# Patient Record
(demographics unavailable — no encounter records)

---

## 2024-12-16 NOTE — ASSESSMENT
[FreeTextEntry1] : Tobacco use: counselling provided for cessation she is agreeable to NRT with gum-    Post nasal drip: trial of fluticasone nasal spray  nonobstructing incidental kidney stones found on CT: check UA hydration discussed  depression: stable c/w current medications and follow up with psych check labwork  low back pain:  mild intermittent DJD of lumbar spine on prior xrays adivsed PRN tylenol   screenings: mammogram cologaurd lung cancer CT chest

## 2024-12-16 NOTE — REVIEW OF SYSTEMS
[As Noted in HPI] : as noted in HPI [Depression] : depression [Negative] : Heme/Lymph [FreeTextEntry9] : low back pain occasionally

## 2024-12-16 NOTE — PHYSICAL EXAM
[Alert] : alert [No Acute Distress] : in no acute distress [Sclera] : the sclera and conjunctiva were normal [EOMI] : extraocular movements were intact [Normal Outer Ear/Nose] : the ears and nose were normal in appearance [Normal Appearance] : the appearance of the neck was normal [Normal] : heart rate was normal and rhythm regular, normal S1 and S2 [Edema] : edema was not present [Normal Gait] : normal gait [No Clubbing, Cyanosis] : no clubbing or cyanosis of the fingernails [Involuntary Movements] : no involuntary movements were seen [Normal Color / Pigmentation] : normal skin color and pigmentation [No Focal Deficits] : no focal deficits [Normal Affect] : the affect was normal [Normal Mood] : the mood was normal [de-identified] : mucus within posterior pharynx

## 2024-12-16 NOTE — HISTORY OF PRESENT ILLNESS
[FreeTextEntry1] : 67yoF with pmhx of depression, seen for follow up viist.  complains of mucus in mouth that she is needing to spit out also with nasal discharge-  (posteriorly)  depression: follows with psych no changes in her medications. adherent with medications  lives with boyfriend  smokes 5 cigarettes daily, she is open to quitting   1 son

## 2025-01-08 NOTE — HISTORY OF PRESENT ILLNESS
[FreeTextEntry1] : Hair loss for 2 weeks. Blisters in scalp after every hair dye. For years. Resolve after 2 weeks.  [de-identified] : No personal or family history of cutaneous malignancy. Self dyes.

## 2025-01-08 NOTE — ASSESSMENT
[FreeTextEntry1] : Alert, oriented, well, pleasant.  Scalp with mild scaling. No erythema. 1 linear dry crust mid vertex. No bald patches. Hair pull negative. Density even throughout.  Seborrheic dermatitis with excoriation. Contact dermatitis by history.  start TSal shampoo leave on 2 minutes before rinsing off. Washes hair twice per week. Change hair dye to hypoallergenic. Follow directions to test prior to applying to scalp. f/u if recurs when active.

## 2025-03-24 NOTE — PHYSICAL EXAM
[Alert] : alert [No Acute Distress] : in no acute distress [Sclera] : the sclera and conjunctiva were normal [EOMI] : extraocular movements were intact [Normal Outer Ear/Nose] : the ears and nose were normal in appearance [Normal Appearance] : the appearance of the neck was normal [Normal] : heart rate was normal and rhythm regular, normal S1 and S2 [Edema] : edema was not present [Involuntary Movements] : no involuntary movements were seen [Normal Color / Pigmentation] : normal skin color and pigmentation [No Focal Deficits] : no focal deficits [Normal Affect] : the affect was normal [Normal Mood] : the mood was normal

## 2025-03-24 NOTE — HISTORY OF PRESENT ILLNESS
[No falls in past year] : Patient reported no falls in the past year [Completely Independent] : Completely independent. [Several Days (1)] : 2.) Feeling down, depressed or hopeless? Several days [Not at All (0)] : 9.) Thoughts that you would be off dead or of hurting yourself in some way? Not at all [Mild] : Severity of Depression is Mild [Somewhat Difficult] : How difficult have these problems made it for you to do your work, take care of things at home, or get along with people? Somewhat difficult [FreeTextEntry1] : 67yoF with pmhx of depression seen for follow up visit.  labwork reviewed with patient as below  depression: follows with psych no changes in her medications. adherent with medications PHQ-9 today 2 she expresses wanting to change medications  lives with boyfriend  last visit discussed smoking cessation: was no successful with using gum still smoking 3-5 cigarrettes daily wants to try to stop  didn't yet do cologaurd    [[id number]] : 2

## 2025-03-24 NOTE — ASSESSMENT
[FreeTextEntry1] : Tobacco use: counselling provided for cessation she is agreeable to NRT with lozenges-  nonobstructing incidental kidney stones found on CT: hydration discussed  depression: stable- c/w current medications and follow up with psych  HLD: tchol 226 ldl 134 borderline risk diet and exercise, smoking cessation monitor

## 2025-04-10 NOTE — HISTORY OF PRESENT ILLNESS
[FreeTextEntry1] : 67yoF with pmhx of depression seen for acute visit.   left hand numbness associated with mild pain.  stiffness with weakness.  she is right handed. denies trauma symptoms both day and night denies arm or neck pain denies sympoms in her right  nightime with leg cramps,more in her left reports she doesn't drink much water does not eat any fruit or vegtables   depression: adherent with medications, denies recent changes. denies side effects

## 2025-04-10 NOTE — ASSESSMENT
[FreeTextEntry1] : CTS of left suspected: discussed brace at night start OT if worsens will refer to ortho  muscle cramps: poor diet, discussed starting MTV increased hydration  depression: stable- c/w current medications and follow up with psych

## 2025-04-10 NOTE — PHYSICAL EXAM
[Alert] : alert [No Acute Distress] : in no acute distress [Sclera] : the sclera and conjunctiva were normal [EOMI] : extraocular movements were intact [Normal Appearance] : the appearance of the neck was normal [No Respiratory Distress] : no respiratory distress [No Acc Muscle Use] : no accessory muscle use [Respiration, Rhythm And Depth] : normal respiratory rhythm and effort [Normal Affect] : the affect was normal [Normal Mood] : the mood was normal [de-identified] : mild thenar atrophy of left

## 2025-06-12 NOTE — HISTORY OF PRESENT ILLNESS
[FreeTextEntry1] : Concerned about the appearance of fine lines of abdomen, forearms for yrs. No treatment. [de-identified] :  No family or personal history of skin cancer.   ABBI MURRY

## 2025-06-12 NOTE — ASSESSMENT
[FreeTextEntry1] : Alert, oriented, well pleasant.   Sun-exposed cutaneous exam. No evidence of cutaneous malignancy.   Brown, yellow papules and plaques generalized. Seborrheic keratoses. No treatment.   Actinic damage. Reviewed sun protection.  violaceous patch right forearm. Purpura. No treatment.   thin skin especially hands Fragile skin.  mild scaling. xerosis  Loose skin upper arms and abdomen. Did not lose weight.  Amlactin lotion to loose skin and dry areas daily.  Discussed procedures for tightening. Not covered explained. Consider seeing plastic surgeron for consultation.   Monitor, report and follow up for changes or symptomatic skin lesions.   Follow up 1 year.